# Patient Record
Sex: MALE | URBAN - METROPOLITAN AREA
[De-identification: names, ages, dates, MRNs, and addresses within clinical notes are randomized per-mention and may not be internally consistent; named-entity substitution may affect disease eponyms.]

---

## 2019-04-03 ENCOUNTER — EMERGENCY (EMERGENCY)
Facility: HOSPITAL | Age: 79
LOS: 1 days | Discharge: HOME | End: 2019-04-03
Attending: EMERGENCY MEDICINE | Admitting: EMERGENCY MEDICINE
Payer: MEDICARE

## 2019-04-03 VITALS
HEART RATE: 83 BPM | TEMPERATURE: 98 F | RESPIRATION RATE: 18 BRPM | SYSTOLIC BLOOD PRESSURE: 165 MMHG | OXYGEN SATURATION: 96 % | DIASTOLIC BLOOD PRESSURE: 81 MMHG

## 2019-04-03 VITALS
TEMPERATURE: 98 F | RESPIRATION RATE: 18 BRPM | SYSTOLIC BLOOD PRESSURE: 170 MMHG | HEART RATE: 85 BPM | DIASTOLIC BLOOD PRESSURE: 80 MMHG | OXYGEN SATURATION: 98 %

## 2019-04-03 PROCEDURE — 99284 EMERGENCY DEPT VISIT MOD MDM: CPT | Mod: 25

## 2019-04-03 PROCEDURE — 70450 CT HEAD/BRAIN W/O DYE: CPT

## 2019-04-03 PROCEDURE — 90471 IMMUNIZATION ADMIN: CPT

## 2019-04-03 PROCEDURE — 70450 CT HEAD/BRAIN W/O DYE: CPT | Mod: 26

## 2019-04-03 PROCEDURE — 99284 EMERGENCY DEPT VISIT MOD MDM: CPT

## 2019-04-03 PROCEDURE — 90715 TDAP VACCINE 7 YRS/> IM: CPT

## 2019-04-03 RX ORDER — TETANUS TOXOID, REDUCED DIPHTHERIA TOXOID AND ACELLULAR PERTUSSIS VACCINE, ADSORBED 5; 2.5; 8; 8; 2.5 [IU]/.5ML; [IU]/.5ML; UG/.5ML; UG/.5ML; UG/.5ML
0.5 SUSPENSION INTRAMUSCULAR ONCE
Qty: 0 | Refills: 0 | Status: COMPLETED | OUTPATIENT
Start: 2019-04-03 | End: 2019-04-03

## 2019-04-03 RX ADMIN — TETANUS TOXOID, REDUCED DIPHTHERIA TOXOID AND ACELLULAR PERTUSSIS VACCINE, ADSORBED 0.5 MILLILITER(S): 5; 2.5; 8; 8; 2.5 SUSPENSION INTRAMUSCULAR at 00:45

## 2019-04-03 NOTE — ED PROVIDER NOTE - OBJECTIVE STATEMENT
79M with h/o HTN, not on blood thinner who p/w small lac to forehead after he accidentally fell out of bed while he was sleeping, he hit his head on the floor, no LOC after fall, no f/c, cp/sob, n/v, dizziness, n/t/w in ext, neck pain or other complaints. Tdap updated.

## 2019-04-03 NOTE — ED PROVIDER NOTE - PHYSICAL EXAMINATION
GEN: Well appearing, well nourished, awake, alert, oriented to person, place, time/situation and in no apparent distress.  ENT: Airway patent, Nasal mucosa clear. Mouth with normal mucosa.  EYES: Clear bilaterally.  RESPIRATORY: Breathing comfortably with normal RR.  CARDIAC: Regular rate and rhythm  ABDOMEN: Soft, nontender, +bowel sounds, no rebound, rigidity, or guarding.  MSK: Range of motion is not limited, no deformities noted.  NEURO: Alert and oriented x 3. Cn 2-12 intact. Strength 5/5 and sensation intact in all 4 extremities.  Gait normal.   SKIN: Skin normal color for race, warm, dry, 1cm superficial lac to right forehead with mild surrounding STS. No evidence of rash.  PSYCH: Alert and oriented to person, place, time/situation. normal mood and affect. no apparent risk to self or others.

## 2019-04-03 NOTE — ED PROVIDER NOTE - CLINICAL SUMMARY MEDICAL DECISION MAKING FREE TEXT BOX
Accidental mechanical FOOB. CT head neg for ICH, no focal neuro deficits, will repair lac with dermabond. Tdap, anticipate DC after

## 2019-04-03 NOTE — ED ADULT NURSE NOTE - OBJECTIVE STATEMENT
80 y/o male with hx of HTN and ESRD on dialysis was BIBA to [facility] ER s/p fall out of bed due to having a bad dream. upon assessment, laceration noted to forehead with pressure dressing in place, abdomen soft, lung fields WNL, breathing is equal and unlabored, pulses palpable. pt denies chest pain, dizziness, blurred vision, slurred speech, nausea, vomiting, diarrhea, fever, chills, LOC, SOB, weakness, fatigue, and palpitations. Care in progress

## 2019-04-03 NOTE — ED ADULT TRIAGE NOTE - ARRIVAL INFO ADDITIONAL COMMENTS
pt states he fell from bed while sleeping and struck his forehead.  1cm lac to forehead.  no bleeding.  no loc.  denies any other injury.

## 2019-04-03 NOTE — ED PROVIDER NOTE - NSFOLLOWUPINSTRUCTIONS_ED_ALL_ED_FT
Tissue Adhesive Wound Care  Some cuts and wounds can be closed with skin glue (tissue adhesive). Skin glue holds the skin together and helps your wound heal faster. Skin glue goes away on its own as your wound gets better.    Follow these instructions at home:  Wound care     Image   Showers are allowed 24 hours after treatment. Do not soak the wound in water. Do not take baths, swim, or use hot tubs. Do not use soaps or creams on your wound.  If a bandage (dressing) was put on the wound:    Wash your hands with soap and water before you change your bandage.  Change the bandage as often as told by your doctor.  Leave skin glue in place. It will fall off on its own after 7–10 days.  Keep the bandage dry.    Do not scratch, rub, or pick at the skin glue.  Do not put tape over the skin glue. The skin glue could come off when you take the tape off.  Protect the wound from another injury.  Protect the wound from sun and tanning beds.  General instructions     Take over-the-counter and prescription medicines only as told by your doctor.  Keep all follow-up visits as told by your doctor. This is important.  Get help right away if:  Your wound is red, puffy (swollen), hot, or tender.  You get a rash after the glue is put on.  You have more pain in the wound.  You have a red streak going away from the wound.  You have yellowish-white fluid (pus) coming from the wound.  You have more bleeding.  You have a fever.  You have chills and you start to shake.  You notice a bad smell coming from the wound.  Your wound or skin glue breaks open.  This information is not intended to replace advice given to you by your health care provider. Make sure you discuss any questions you have with your health care provider.        Laceration    A laceration is a cut that goes through all of the layers of the skin and into the tissue that is right under the skin. Some lacerations heal on their own. Others need to be closed with skin adhesive strips, skin glue, stitches (sutures), or staples. Proper laceration care minimizes the risk of infection and helps the laceration to heal better.  If non-absorbable stitches or staples have been placed, they must be taken out within the time frame instructed by your healthcare provider.    SEEK IMMEDIATE MEDICAL CARE IF YOU HAVE ANY OF THE FOLLOWING SYMPTOMS: swelling around the wound, worsening pain, drainage from the wound, red streaking going away from your wound, inability to move finger or toe near the laceration, or discoloration of skin near the laceration.
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
## 2019-04-07 DIAGNOSIS — S01.81XA LACERATION WITHOUT FOREIGN BODY OF OTHER PART OF HEAD, INITIAL ENCOUNTER: ICD-10-CM
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
## 2019-04-07 DIAGNOSIS — I12.0 HYPERTENSIVE CHRONIC KIDNEY DISEASE WITH STAGE 5 CHRONIC KIDNEY DISEASE OR END STAGE RENAL DISEASE: ICD-10-CM

## 2019-04-07 DIAGNOSIS — N18.6 END STAGE RENAL DISEASE: ICD-10-CM

## 2019-04-07 DIAGNOSIS — Z23 ENCOUNTER FOR IMMUNIZATION: ICD-10-CM
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
## 2019-05-25 ENCOUNTER — EMERGENCY (EMERGENCY)
Facility: HOSPITAL | Age: 79
LOS: 1 days | Discharge: HOME | End: 2019-05-25
Attending: EMERGENCY MEDICINE | Admitting: EMERGENCY MEDICINE
Payer: MEDICARE

## 2019-05-25 VITALS
TEMPERATURE: 98 F | OXYGEN SATURATION: 98 % | RESPIRATION RATE: 16 BRPM | DIASTOLIC BLOOD PRESSURE: 61 MMHG | SYSTOLIC BLOOD PRESSURE: 117 MMHG | HEART RATE: 95 BPM
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
## 2019-05-25 DIAGNOSIS — Z99.2 DEPENDENCE ON RENAL DIALYSIS: ICD-10-CM

## 2019-05-25 DIAGNOSIS — T82.590A OTHER MECHANICAL COMPLICATION OF SURGICALLY CREATED ARTERIOVENOUS FISTULA, INITIAL ENCOUNTER: ICD-10-CM

## 2019-05-25 DIAGNOSIS — N18.6 END STAGE RENAL DISEASE: ICD-10-CM
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
## 2019-05-25 DIAGNOSIS — I12.0 HYPERTENSIVE CHRONIC KIDNEY DISEASE WITH STAGE 5 CHRONIC KIDNEY DISEASE OR END STAGE RENAL DISEASE: ICD-10-CM
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
## 2019-05-25 PROCEDURE — 99283 EMERGENCY DEPT VISIT LOW MDM: CPT

## 2019-05-25 PROCEDURE — 99282 EMERGENCY DEPT VISIT SF MDM: CPT

## 2019-05-25 NOTE — ED ADULT NURSE NOTE - OBJECTIVE STATEMENT
Patient is a 78yo M, BIBA, AAOx3, in NAD, vitals stable, complaining of bleeding at LUE AV fistula site.  Patient states he completed dialysis today (Tues/Thurs/Sat), removed the bandage himself and site started bleeding.  Patient denies any pain, blood thinners, dizziness or any other complaints at this time.

## 2019-05-25 NOTE — ED PROVIDER NOTE - CARE PLAN
Principal Discharge DX:	Complication associated with vascular device, initial encounter  Secondary Diagnosis:	ESRD (end stage renal disease)

## 2019-05-25 NOTE — ED PROVIDER NOTE - NSFOLLOWUPINSTRUCTIONS_ED_ALL_ED_FT
Keep pressure dressing on arm. Follow up with your primary care physician/ nephrologist for re-evaluation. Return to er for any new or worsening symptoms (re-bleeding, dizziness, weakness, shortness of breath...)    Vascular Access for Hemodialysis  Image Image ImageA vascular access is a connection to the blood inside your blood vessels that allows blood to be easily removed from your body and returned to your body during kidney dialysis (hemodialysis). Hemodialysis is a procedure in which a machine outside of the body filters the blood of a person whose kidneys are no longer working properly. There are three types of vascular accesses:  Arteriovenous fistula (AVF). This is a connection between an artery and a vein (usually in the arm) that is made by sewing them together. Blood in the artery flows directly into the vein, causing it to get larger over time. This makes it easier for the vein to be used for hemodialysis. An arteriovenous fistula takes 1–6 months to develop after surgery.  Arteriovenous graft (AVG). This is a connection between an artery and a vein in the arm that is made with a tube. An arteriovenous graft can be used within 2–3 weeks of surgery.  Venous catheter. This is a thin, flexible tube that is placed in a large vein (usually in the neck, chest, or groin). A venous catheter for hemodialysis contains two tubes that come out of the skin. A venous catheter can be used right away. It is usually used as a temporary access if you need hemodialysis before a fistula or graft has developed, or if kidney failure is sudden (acute) and likely to improve without the need for long-term dialysis. It may also be used as a permanent access if a fistula or graft cannot be created.  Which type of access is best for me?  The type of access that is best for you depends on the size and strength of your veins, your age, and any other health problems that you have, such as diabetes. An ultrasound test may be used to look at your veins to help make this decision.    A fistula is usually the preferred type of access. It can last several years and is less likely than the other types of accesses to become infected or to cause a blood clot within a blood vessel (thrombosis). However, a fistula is not an option for everyone. If your veins are not the right size or if the fistula does not develop properly, a graft may be used instead. Grafts require you to have strong veins. If your veins are not strong enough for a graft, a catheter may be used. Catheters are more likely than fistulas and grafts to become infected or to have a thrombosis.    Sometimes, only one type of access is an option. Your health care provider will help you determine which type of access is best for you.    How is a vascular access used?  The way that the access is used depends on the type of access:  If the access is a fistula or graft, two needles are inserted through the skin into the access before each hemodialysis session. Blood leaves the body through one of the needles and travels through a tube to the hemodialysis machine (dialyzer). Then it flows through another tube and returns to the body through the second needle.  If the access is a catheter, one tube is connected directly to the tube that leads to the dialyzer, and the other tube is connected to a tube that leads away from the dialyzer. Blood leaves the body through one tube and returns to the body through the other.  What problems can occur with vascular access?  A blood clot within a blood vessel (thrombosis). Thrombosis can lead to a narrowing of a blood vessel (stenosis). If thrombosis occurs frequently, another access site may be created as a backup.  Infection.  Heart enlargement (cardiomegaly) and heart failure. Changes in blood flow may cause an increase in blood pressure or heart rate, making your heart work harder to pump blood.  These problems are most likely to occur with a venous catheter and least likely to occur with an arteriovenous fistula.    How do I care for my vascular access?  Image   Wear a medical alert bracelet. In case of an emergency, this bracelet tells health care providers that you are a dialysis patient and allows them to care for your veins appropriately.    If you have a graft or fistula:  A "bruit" is a noise that is heard with a stethoscope, and a "thrill" is a vibration that is felt over the graft or fistula. The presence of the bruit and thrill indicates that the access is working. You will be taught to feel for the thrill each day. If this is not felt, the access may be clotted. Call your health care provider.  Keep your arm straight and raised (elevated) above your heart while the access site is healing.  You may freely use the arm where your vascular access is located after the site heals. Keep the following in mind:  Avoid pressure on the arm.  Avoid lifting heavy objects with the arm.  Avoid sleeping on the arm.  Avoid wearing tight-sleeved shirts or jewelry around the graft or fistula.  Do not allow blood pressure monitoring or needle punctures on the side where the graft or fistula is located.  With permission from your health care provider, you may do exercises to help with blood flow through a fistula. These exercises involve squeezing a rubber ball or other soft objects as instructed.  Wash your access site according to directions from your health care provider.  If you have a venous catheter:  Keep the insertion site clean and dry at all times.  If you are told you can shower after the site heals, use a protective covering over the catheter to keep it dry.  Follow directions from your health care provider for bandage (dressing) changes.  Ask your health care provider what activities are safe for you. You may be restricted from lifting or making repetitive arm movements on the side with the catheter.  Contact a health care provider if:  Swelling around the graft or fistula gets worse.  You develop new pain.  Your catheter gets damaged.  Get help right away if:  You have pain, numbness, an unusual pale skin color, or blue fingers or sores at the tips of your fingers in the hand on the side of your fistula.  You have chills.  You have a fever.  You have pus or other fluid (drainage) at the vascular access site.  You develop skin redness or red streaking on the skin around, above, or below the vascular access.  You have bleeding at the vascular access that cannot be easily controlled.  Your catheter gets pulled out of place.  You feel your heart racing or skipping beats.  You have chest pain.  Summary  A vascular access is a connection to the blood inside your blood vessels that allows blood to be easily removed from your body and returned to your body during kidney dialysis (hemodialysis).  There are three types of vascular accesses.The type of access that is best for you depends on the size and strength of your veins, your age, and any other health problems that you have, such as diabetes.  A fistula is usually the preferred type of access, although it is not an option for everyone. It can last several years and is less likely than the other types of accesses to become infected or to cause a blood clot within a blood vessel (thrombosis).  Wear a medical alert bracelet. In case of an emergency, this tells health care providers that you are a dialysis patient.  This information is not intended to replace advice given to you by your health care provider. Make sure you discuss any questions you have with your health care provider.    Document Released: 03/09/2004 Document Revised: 01/12/2018 Document Reviewed: 01/12/2018  Elsevier Interactive Patient Education © 2019 [employer]

## 2019-05-25 NOTE — ED ADULT TRIAGE NOTE - OTHER COMPLAINTS
pt reports bleeding from LUE fistula site. completed dialysis in full today. denies daily blood thinner.

## 2019-05-25 NOTE — ED PROVIDER NOTE - PHYSICAL EXAMINATION
VITAL SIGNS: I have reviewed nursing notes and confirm.  CONSTITUTIONAL: Well-developed; well-nourished; in no acute distress.   SKIN:  warm and dry, no acute rash.   HEAD:  normocephalic, atraumatic.  EYES: EOM intact; conjunctiva and sclera clear.  ENT: No nasal discharge; airway clear.   NECK: Supple  CARD: S1, S2 normal; no murmurs, gallops, or rubs. Regular rate and rhythm.   RESP:  Clear to auscultation b/l, no wheezes, rales or rhonchi.  L upper EXT: Normal ROM. No clubbing, cyanosis or edema. + avf to upper arm with + bruit/thrill. No bleeding noted. Distal pulses intact.   NEURO: Alert, oriented, motor/ sensation intact.  PSYCH: Cooperative, mood and affect appropriate.

## 2019-05-25 NOTE — ED PROVIDER NOTE - CLINICAL SUMMARY MEDICAL DECISION MAKING FREE TEXT BOX
Impression: bleeding from lue avf after dialysis today. Bleeding now resolved. Dressing removed and no evidence of active bleed at this time. New dressing applied to arm. NVI. ED evaluation and management discussed with the patient in detail.  Close PMD follow up encouraged.  Strict ED return instructions discussed in detail and patient given the opportunity to ask any questions about their discharge diagnosis and instructions. Patient verbalized understanding.

## 2019-05-25 NOTE — ED PROVIDER NOTE - OBJECTIVE STATEMENT
Pt is an 80yo m, h/o htn, esrd on hd tu/th/sat, who p/w bleeding from lue avf after dialysis today. Pt states he removed the bandage too early. No associated pain, n/t/w, cp, sob, dizziness, weakness... Not on any ac.

## 2019-12-29 ENCOUNTER — EMERGENCY (EMERGENCY)
Facility: HOSPITAL | Age: 79
LOS: 1 days | Discharge: HOME | End: 2019-12-29
Attending: EMERGENCY MEDICINE | Admitting: EMERGENCY MEDICINE
Payer: MEDICARE

## 2019-12-29 VITALS
RESPIRATION RATE: 18 BRPM | HEART RATE: 91 BPM | DIASTOLIC BLOOD PRESSURE: 77 MMHG | TEMPERATURE: 99 F | WEIGHT: 175.05 LBS | OXYGEN SATURATION: 98 % | SYSTOLIC BLOOD PRESSURE: 152 MMHG

## 2019-12-29 VITALS
TEMPERATURE: 99 F | OXYGEN SATURATION: 96 % | DIASTOLIC BLOOD PRESSURE: 80 MMHG | HEART RATE: 83 BPM | RESPIRATION RATE: 18 BRPM | SYSTOLIC BLOOD PRESSURE: 170 MMHG

## 2019-12-29 LAB
ALBUMIN SERPL ELPH-MCNC: 4.1 G/DL — SIGNIFICANT CHANGE UP (ref 3.3–5)
ALP SERPL-CCNC: 64 U/L — SIGNIFICANT CHANGE UP (ref 40–120)
ALT FLD-CCNC: 17 U/L — SIGNIFICANT CHANGE UP (ref 10–45)
ANION GAP SERPL CALC-SCNC: 16 MMOL/L — SIGNIFICANT CHANGE UP (ref 5–17)
AST SERPL-CCNC: 28 U/L — SIGNIFICANT CHANGE UP (ref 10–40)
BASOPHILS # BLD AUTO: 0.03 K/UL — SIGNIFICANT CHANGE UP (ref 0–0.2)
BASOPHILS NFR BLD AUTO: 0.8 % — SIGNIFICANT CHANGE UP (ref 0–2)
BILIRUB SERPL-MCNC: 0.5 MG/DL — SIGNIFICANT CHANGE UP (ref 0.2–1.2)
BUN SERPL-MCNC: 23 MG/DL — SIGNIFICANT CHANGE UP (ref 7–23)
CALCIUM SERPL-MCNC: 9.5 MG/DL — SIGNIFICANT CHANGE UP (ref 8.4–10.5)
CHLORIDE SERPL-SCNC: 95 MMOL/L — LOW (ref 96–108)
CO2 SERPL-SCNC: 31 MMOL/L — SIGNIFICANT CHANGE UP (ref 22–31)
CREAT SERPL-MCNC: 8.34 MG/DL — HIGH (ref 0.5–1.3)
EOSINOPHIL # BLD AUTO: 0.07 K/UL — SIGNIFICANT CHANGE UP (ref 0–0.5)
EOSINOPHIL NFR BLD AUTO: 2 % — SIGNIFICANT CHANGE UP (ref 0–6)
GLUCOSE SERPL-MCNC: 84 MG/DL — SIGNIFICANT CHANGE UP (ref 70–99)
HCT VFR BLD CALC: 27 % — LOW (ref 39–50)
HGB BLD-MCNC: 8.9 G/DL — LOW (ref 13–17)
IMM GRANULOCYTES NFR BLD AUTO: 0.3 % — SIGNIFICANT CHANGE UP (ref 0–1.5)
LACTATE SERPL-SCNC: 1.2 MMOL/L — SIGNIFICANT CHANGE UP (ref 0.5–2)
LIDOCAIN IGE QN: 25 U/L — SIGNIFICANT CHANGE UP (ref 7–60)
LYMPHOCYTES # BLD AUTO: 0.78 K/UL — LOW (ref 1–3.3)
LYMPHOCYTES # BLD AUTO: 22 % — SIGNIFICANT CHANGE UP (ref 13–44)
MCHC RBC-ENTMCNC: 33 GM/DL — SIGNIFICANT CHANGE UP (ref 32–36)
MCHC RBC-ENTMCNC: 34.4 PG — HIGH (ref 27–34)
MCV RBC AUTO: 104.2 FL — HIGH (ref 80–100)
MONOCYTES # BLD AUTO: 0.31 K/UL — SIGNIFICANT CHANGE UP (ref 0–0.9)
MONOCYTES NFR BLD AUTO: 8.7 % — SIGNIFICANT CHANGE UP (ref 2–14)
NEUTROPHILS # BLD AUTO: 2.35 K/UL — SIGNIFICANT CHANGE UP (ref 1.8–7.4)
NEUTROPHILS NFR BLD AUTO: 66.2 % — SIGNIFICANT CHANGE UP (ref 43–77)
NRBC # BLD: 0 /100 WBCS — SIGNIFICANT CHANGE UP (ref 0–0)
PLATELET # BLD AUTO: 207 K/UL — SIGNIFICANT CHANGE UP (ref 150–400)
POTASSIUM SERPL-MCNC: 3.6 MMOL/L — SIGNIFICANT CHANGE UP (ref 3.5–5.3)
POTASSIUM SERPL-SCNC: 3.6 MMOL/L — SIGNIFICANT CHANGE UP (ref 3.5–5.3)
PROT SERPL-MCNC: 7.2 G/DL — SIGNIFICANT CHANGE UP (ref 6–8.3)
RBC # BLD: 2.59 M/UL — LOW (ref 4.2–5.8)
RBC # FLD: 15.1 % — HIGH (ref 10.3–14.5)
SODIUM SERPL-SCNC: 142 MMOL/L — SIGNIFICANT CHANGE UP (ref 135–145)
WBC # BLD: 3.55 K/UL — LOW (ref 3.8–10.5)
WBC # FLD AUTO: 3.55 K/UL — LOW (ref 3.8–10.5)

## 2019-12-29 PROCEDURE — 93005 ELECTROCARDIOGRAM TRACING: CPT

## 2019-12-29 PROCEDURE — 74177 CT ABD & PELVIS W/CONTRAST: CPT | Mod: 26

## 2019-12-29 PROCEDURE — 74177 CT ABD & PELVIS W/CONTRAST: CPT

## 2019-12-29 PROCEDURE — 96374 THER/PROPH/DIAG INJ IV PUSH: CPT | Mod: XU

## 2019-12-29 PROCEDURE — 71045 X-RAY EXAM CHEST 1 VIEW: CPT

## 2019-12-29 PROCEDURE — 36415 COLL VENOUS BLD VENIPUNCTURE: CPT

## 2019-12-29 PROCEDURE — 80053 COMPREHEN METABOLIC PANEL: CPT

## 2019-12-29 PROCEDURE — 83605 ASSAY OF LACTIC ACID: CPT

## 2019-12-29 PROCEDURE — 85025 COMPLETE CBC W/AUTO DIFF WBC: CPT

## 2019-12-29 PROCEDURE — 83690 ASSAY OF LIPASE: CPT

## 2019-12-29 PROCEDURE — 71045 X-RAY EXAM CHEST 1 VIEW: CPT | Mod: 26

## 2019-12-29 PROCEDURE — 96375 TX/PRO/DX INJ NEW DRUG ADDON: CPT

## 2019-12-29 PROCEDURE — 99284 EMERGENCY DEPT VISIT MOD MDM: CPT

## 2019-12-29 PROCEDURE — 99284 EMERGENCY DEPT VISIT MOD MDM: CPT | Mod: 25

## 2019-12-29 PROCEDURE — 93010 ELECTROCARDIOGRAM REPORT: CPT

## 2019-12-29 RX ORDER — SODIUM CHLORIDE 9 MG/ML
500 INJECTION INTRAMUSCULAR; INTRAVENOUS; SUBCUTANEOUS ONCE
Refills: 0 | Status: COMPLETED | OUTPATIENT
Start: 2019-12-29 | End: 2019-12-29

## 2019-12-29 RX ORDER — ACETAMINOPHEN 500 MG
650 TABLET ORAL ONCE
Refills: 0 | Status: COMPLETED | OUTPATIENT
Start: 2019-12-29 | End: 2019-12-29

## 2019-12-29 RX ORDER — IOHEXOL 300 MG/ML
30 INJECTION, SOLUTION INTRAVENOUS ONCE
Refills: 0 | Status: COMPLETED | OUTPATIENT
Start: 2019-12-29 | End: 2019-12-29

## 2019-12-29 RX ORDER — ONDANSETRON 8 MG/1
4 TABLET, FILM COATED ORAL ONCE
Refills: 0 | Status: COMPLETED | OUTPATIENT
Start: 2019-12-29 | End: 2019-12-29

## 2019-12-29 RX ORDER — FAMOTIDINE 10 MG/ML
20 INJECTION INTRAVENOUS ONCE
Refills: 0 | Status: COMPLETED | OUTPATIENT
Start: 2019-12-29 | End: 2019-12-29

## 2019-12-29 RX ADMIN — ONDANSETRON 4 MILLIGRAM(S): 8 TABLET, FILM COATED ORAL at 17:08

## 2019-12-29 RX ADMIN — Medication 650 MILLIGRAM(S): at 17:08

## 2019-12-29 RX ADMIN — FAMOTIDINE 20 MILLIGRAM(S): 10 INJECTION INTRAVENOUS at 17:08

## 2019-12-29 RX ADMIN — Medication 30 MILLILITER(S): at 17:08

## 2019-12-29 RX ADMIN — SODIUM CHLORIDE 500 MILLILITER(S): 9 INJECTION INTRAMUSCULAR; INTRAVENOUS; SUBCUTANEOUS at 17:05

## 2019-12-29 RX ADMIN — IOHEXOL 30 MILLILITER(S): 300 INJECTION, SOLUTION INTRAVENOUS at 17:08

## 2019-12-29 NOTE — ED PROVIDER NOTE - CLINICAL SUMMARY MEDICAL DECISION MAKING FREE TEXT BOX
79M PMH HTN, ESRD on HD (T/Th/Sat, last HD yesterday) p/w diffuse abd pain, since this morning, waxing/waning, similar to prior. Pt states he thinks its because he drank too much etoh yesterday, drinks most days. +NBNB NV today and no appetite. +Passing gas. Last BM yesterday was normal. No other systemic symptoms. Vitals wnl, exam as above.  ddx: SBO vs. colitis vs. terrance vs. pancreatitis vs. gerd/gastritis/PUD.   cbc, cmp, lipase, lactate. CXR. CT. Judicious IVF. symptom control, reassess.

## 2019-12-29 NOTE — ED PROVIDER NOTE - PATIENT PORTAL LINK FT
You can access the FollowMyHealth Patient Portal offered by [facility] by registering at the following website: [email or web address]. By joining [employer]’s FollowMyHealth portal, you will also be able to view your health information using other applications (apps) compatible with our system.

## 2019-12-29 NOTE — ED ADULT TRIAGE NOTE - CHIEF COMPLAINT QUOTE
diffused abdominal pain and vomiting since this morning.  Last BM yesterday.  Hx ESRD via Left AV fistula (last hd yesterday T, Th, Sa)

## 2019-12-29 NOTE — ED PROVIDER NOTE - PROGRESS NOTE DETAILS
[place]: Anemic, unknown baseline. has no black stool and no symptoms of anemia. Creatinine elevated. Other labs grossly wnl. CT w/ no acute pathology. Py completely asymptomatic, tolerating po, ambulating around ER w/o any issues requesting to leave. abd soft ntnd. Clinically no indication for further emergent ED workup or hospitalization at this time. comfortable for dc, outpt pmd f/u.

## 2019-12-29 NOTE — ED PROVIDER NOTE - NSFOLLOWUPINSTRUCTIONS_ED_ALL_ED_FT
Your blood count is low, discuss this with your primary doctor.   Can take tylenol 650mg every 6hrs as needed for pain.  Can take pepcid 20mg once or twice a day.   Follow up with your primary doctor within 1-2 days.   Return for persistent fever/vomit, uncontrolled pain, difficulty breathing, worsening lightheaded, worsening shaking, black stool.  Follow up with gastroenterologist for persistent symptoms. Can call [phone] to schedule appointment.   Alcohol is harmful to your health.     SEEK IMMEDIATE MEDICAL CARE IF YOU HAVE ANY OF THE FOLLOWING SYMPTOMS: worsening abdominal pain, uncontrollable vomiting, profuse diarrhea, inability to have bowel movements or pass gas, black or bloody stools, fever accompanying chest pain or back pain, or fainting. These symptoms may represent a serious problem that is an emergency. Do not wait to see if the symptoms will go away. Get medical help right away. Call 911 and do not drive yourself to the hospital.    Abdominal Pain, Adult    Abdominal pain can be caused by many things. Often, abdominal pain is not serious and it gets better with no treatment or by being treated at home. However, sometimes abdominal pain is serious. Your health care provider will do a medical history and a physical exam to try to determine the cause of your abdominal pain.    Follow these instructions at home:  Take over-the-counter and prescription medicines only as told by your health care provider. Do not take a laxative unless told by your health care provider.  Drink enough fluid to keep your urine clear or pale yellow.  Watch your condition for any changes.  Keep all follow-up visits as told by your health care provider. This is important.    Contact a health care provider if:  Your abdominal pain changes or gets worse.  You are not hungry or you lose weight without trying.  You are constipated or have diarrhea for more than 2–3 days.  You have pain when you urinate or have a bowel movement.  Your abdominal pain wakes you up at night.  Your pain gets worse with meals, after eating, or with certain foods.  You are throwing up and cannot keep anything down.  You have a fever.    Get help right away if:  Your pain does not go away as soon as your health care provider told you to expect.  You cannot stop throwing up.  Your pain is only in areas of the abdomen, such as the right side or the left lower portion of the abdomen.  You have bloody or black stools, or stools that look like tar.  You have severe pain, cramping, or bloating in your abdomen.  You have signs of dehydration, such as:  Dark urine, very little urine, or no urine.  Cracked lips.  Dry mouth.  Sunken eyes.  Sleepiness.  Weakness.

## 2019-12-29 NOTE — ED PROVIDER NOTE - PHYSICAL EXAMINATION
ED tech vira chaperone: 99.6 rectal temp. no stool obtained on digital exam. no black stool or brbpr. no impaction.   no LE edema, normal equal distal pulses.   LUE AVF +thrill, c/d/i.   abd: Hyperactive BS, soft, diffuse ttp, no rebound/guarding. no CVAT.

## 2019-12-29 NOTE — ED ADULT NURSE NOTE - NSIMPLEMENTINTERV_GEN_ALL_ED
Implemented All Universal Safety Interventions:  [place] to call system. Call bell, personal items and telephone within reach. Instruct patient to call for assistance. Room bathroom lighting operational. Non-slip footwear when patient is off stretcher. Physically safe environment: no spills, clutter or unnecessary equipment. Stretcher in lowest position, wheels locked, appropriate side rails in place.

## 2019-12-29 NOTE — ED PROVIDER NOTE - OBJECTIVE STATEMENT
79M PMH HTN, ESRD on HD (T/Th/Sat, last HD yesterday) p/w diffuse abd pain, since this morning, waxing/waning, similar to prior. Pt states he thinks its because he drank too much etoh yesterday, drinks most days. +NBNB NV today and no appetite. +Passing gas. Last BM yesterday was normal. Denies f/c, diarrhea, black/bloody stool, urinary complaints, focal weakness/numbness, lightheadedness, back pain, testicular/penile pain, rashes, recent travel, SOB/CP, URI symptoms.

## 2019-12-29 NOTE — ED PROVIDER NOTE - CARE PLAN
Principal Discharge DX:	Abdominal pain Principal Discharge DX:	Abdominal pain  Secondary Diagnosis:	Anemia

## 2019-12-29 NOTE — ED ADULT NURSE NOTE - OBJECTIVE STATEMENT
pt states that he was vomiting this am and had some diarrhea , states that he was drinking a pint of rum yesterday , having generalized abdominal pain pt states that he was vomiting this am and had some diarrhea , states that he was drinking a pint of rum yesterday , having generalized abdominal pain, hx dialysis pt , last dialysis yesterday

## 2020-01-04 DIAGNOSIS — D64.9 ANEMIA, UNSPECIFIED: ICD-10-CM

## 2020-01-04 DIAGNOSIS — R10.84 GENERALIZED ABDOMINAL PAIN: ICD-10-CM

## 2020-04-01 ENCOUNTER — OUTPATIENT (OUTPATIENT)
Dept: OUTPATIENT SERVICES | Facility: HOSPITAL | Age: 80
LOS: 1 days | End: 2020-04-01
Payer: MEDICARE

## 2020-04-01 PROCEDURE — G9001: CPT

## 2020-04-21 DIAGNOSIS — Z71.89 OTHER SPECIFIED COUNSELING: ICD-10-CM

## 2020-04-21 PROBLEM — N18.6 END STAGE RENAL DISEASE: Chronic | Status: ACTIVE | Noted: 2019-12-29
